# Patient Record
Sex: FEMALE | Race: WHITE | NOT HISPANIC OR LATINO
[De-identification: names, ages, dates, MRNs, and addresses within clinical notes are randomized per-mention and may not be internally consistent; named-entity substitution may affect disease eponyms.]

---

## 2018-03-06 ENCOUNTER — FORM ENCOUNTER (OUTPATIENT)
Age: 83
End: 2018-03-06

## 2018-09-18 ENCOUNTER — FORM ENCOUNTER (OUTPATIENT)
Age: 83
End: 2018-09-18

## 2018-10-03 ENCOUNTER — FORM ENCOUNTER (OUTPATIENT)
Age: 83
End: 2018-10-03

## 2018-11-18 ENCOUNTER — FORM ENCOUNTER (OUTPATIENT)
Age: 83
End: 2018-11-18

## 2019-03-01 PROBLEM — Z00.00 ENCOUNTER FOR PREVENTIVE HEALTH EXAMINATION: Status: ACTIVE | Noted: 2019-03-01

## 2019-03-15 DIAGNOSIS — Z78.9 OTHER SPECIFIED HEALTH STATUS: ICD-10-CM

## 2019-03-15 DIAGNOSIS — Z86.79 PERSONAL HISTORY OF OTHER DISEASES OF THE CIRCULATORY SYSTEM: ICD-10-CM

## 2019-03-15 DIAGNOSIS — Z86.39 PERSONAL HISTORY OF OTHER ENDOCRINE, NUTRITIONAL AND METABOLIC DISEASE: ICD-10-CM

## 2019-03-15 RX ORDER — LEVOTHYROXINE SODIUM 0.07 MG/1
75 TABLET ORAL
Refills: 0 | Status: ACTIVE | COMMUNITY
Start: 2019-03-15

## 2019-03-15 RX ORDER — FUROSEMIDE 20 MG/1
20 TABLET ORAL
Refills: 0 | Status: ACTIVE | COMMUNITY
Start: 2019-03-15

## 2019-03-15 RX ORDER — RALOXIFENE HYDROCHLORIDE 60 MG/1
60 TABLET, FILM COATED ORAL
Refills: 0 | Status: ACTIVE | COMMUNITY
Start: 2019-03-15

## 2019-03-15 RX ORDER — OMEPRAZOLE 20 MG/1
20 CAPSULE, DELAYED RELEASE ORAL
Refills: 0 | Status: ACTIVE | COMMUNITY
Start: 2019-03-15

## 2019-03-18 ENCOUNTER — APPOINTMENT (OUTPATIENT)
Dept: ORTHOPEDIC SURGERY | Facility: CLINIC | Age: 84
End: 2019-03-18
Payer: MEDICARE

## 2019-03-18 PROCEDURE — 20610 DRAIN/INJ JOINT/BURSA W/O US: CPT | Mod: RT

## 2019-03-18 RX ORDER — HYALURONATE SODIUM 20 MG/2 ML
20 SYRINGE (ML) INTRAARTICULAR
Refills: 0 | Status: COMPLETED | OUTPATIENT
Start: 2019-03-18

## 2019-03-18 RX ORDER — IBUPROFEN 600 MG/1
600 TABLET, FILM COATED ORAL
Refills: 0 | Status: ACTIVE | COMMUNITY

## 2019-03-18 RX ADMIN — Medication 2 MG/2ML: at 00:00

## 2019-03-18 NOTE — HISTORY OF PRESENT ILLNESS
[de-identified] : 83 year old female presents to the office today for her first euflexxa injection.

## 2019-03-18 NOTE — ASSESSMENT
[FreeTextEntry1] : Patient comes in for the first few flex injection in the right knee performed under sterile technique she tolerated well

## 2019-03-25 ENCOUNTER — APPOINTMENT (OUTPATIENT)
Dept: ORTHOPEDIC SURGERY | Facility: CLINIC | Age: 84
End: 2019-03-25
Payer: MEDICARE

## 2019-03-25 PROCEDURE — 20610 DRAIN/INJ JOINT/BURSA W/O US: CPT | Mod: RT

## 2019-03-25 RX ORDER — HYALURONATE SODIUM 20 MG/2 ML
20 SYRINGE (ML) INTRAARTICULAR
Refills: 0 | Status: COMPLETED | OUTPATIENT
Start: 2019-03-25

## 2019-03-25 RX ADMIN — Medication 2 MG/2ML: at 00:00

## 2019-03-25 NOTE — HISTORY OF PRESENT ILLNESS
[de-identified] : 83 year old female presents to the office today for her second euflexxa injection in her arthritic right knee.

## 2019-03-25 NOTE — ASSESSMENT
[FreeTextEntry1] : Patient comes in today for you flex injection in the right knee. There is performed under sterile technique. Tolerated well. Patient will followup in a week's time

## 2019-04-01 ENCOUNTER — APPOINTMENT (OUTPATIENT)
Dept: ORTHOPEDIC SURGERY | Facility: CLINIC | Age: 84
End: 2019-04-01
Payer: MEDICARE

## 2019-04-01 DIAGNOSIS — M25.561 PAIN IN RIGHT KNEE: ICD-10-CM

## 2019-04-01 PROCEDURE — 20610 DRAIN/INJ JOINT/BURSA W/O US: CPT | Mod: RT

## 2019-04-01 RX ORDER — HYALURONATE SODIUM 20 MG/2 ML
20 SYRINGE (ML) INTRAARTICULAR
Refills: 0 | Status: COMPLETED | OUTPATIENT
Start: 2019-04-01

## 2019-04-01 RX ADMIN — Medication 2 MG/2ML: at 00:00

## 2019-04-01 NOTE — ASSESSMENT
[FreeTextEntry1] : Patient comes in today for her third reflex injection in the right knee performed under sterile technique she tolerated well

## 2019-04-01 NOTE — HISTORY OF PRESENT ILLNESS
[de-identified] : 83 year old female presents to the office today for Euflexxa injection of her arthritic right knee.

## 2019-05-20 ENCOUNTER — RECORD ABSTRACTING (OUTPATIENT)
Age: 84
End: 2019-05-20

## 2019-05-20 DIAGNOSIS — E78.5 HYPERLIPIDEMIA, UNSPECIFIED: ICD-10-CM

## 2019-05-20 DIAGNOSIS — Z87.898 PERSONAL HISTORY OF OTHER SPECIFIED CONDITIONS: ICD-10-CM

## 2019-05-20 DIAGNOSIS — Z86.69 PERSONAL HISTORY OF OTHER DISEASES OF THE NERVOUS SYSTEM AND SENSE ORGANS: ICD-10-CM

## 2019-05-20 DIAGNOSIS — M65.4 RADIAL STYLOID TENOSYNOVITIS [DE QUERVAIN]: ICD-10-CM

## 2019-05-20 DIAGNOSIS — M19.90 UNSPECIFIED OSTEOARTHRITIS, UNSPECIFIED SITE: ICD-10-CM

## 2019-05-20 DIAGNOSIS — Z78.9 OTHER SPECIFIED HEALTH STATUS: ICD-10-CM

## 2019-05-20 DIAGNOSIS — R73.03 PREDIABETES.: ICD-10-CM

## 2019-05-22 ENCOUNTER — OUTPATIENT (OUTPATIENT)
Dept: OUTPATIENT SERVICES | Facility: HOSPITAL | Age: 84
LOS: 1 days | Discharge: HOME | End: 2019-05-22
Payer: COMMERCIAL

## 2019-05-22 VITALS
WEIGHT: 158.07 LBS | DIASTOLIC BLOOD PRESSURE: 73 MMHG | HEIGHT: 63 IN | RESPIRATION RATE: 16 BRPM | OXYGEN SATURATION: 99 % | TEMPERATURE: 96 F | SYSTOLIC BLOOD PRESSURE: 124 MMHG | HEART RATE: 69 BPM

## 2019-05-22 DIAGNOSIS — Z90.710 ACQUIRED ABSENCE OF BOTH CERVIX AND UTERUS: Chronic | ICD-10-CM

## 2019-05-22 DIAGNOSIS — G56.02 CARPAL TUNNEL SYNDROME, LEFT UPPER LIMB: ICD-10-CM

## 2019-05-22 DIAGNOSIS — Z98.890 OTHER SPECIFIED POSTPROCEDURAL STATES: Chronic | ICD-10-CM

## 2019-05-22 DIAGNOSIS — Z01.818 ENCOUNTER FOR OTHER PREPROCEDURAL EXAMINATION: ICD-10-CM

## 2019-05-22 LAB
ALBUMIN SERPL ELPH-MCNC: 4.1 G/DL — SIGNIFICANT CHANGE UP (ref 3.5–5.2)
ALP SERPL-CCNC: 100 U/L — SIGNIFICANT CHANGE UP (ref 30–115)
ALT FLD-CCNC: 10 U/L — SIGNIFICANT CHANGE UP (ref 0–41)
ANION GAP SERPL CALC-SCNC: 12 MMOL/L — SIGNIFICANT CHANGE UP (ref 7–14)
APTT BLD: 30.6 SEC — SIGNIFICANT CHANGE UP (ref 27–39.2)
AST SERPL-CCNC: 14 U/L — SIGNIFICANT CHANGE UP (ref 0–41)
BASOPHILS # BLD AUTO: 0.03 K/UL — SIGNIFICANT CHANGE UP (ref 0–0.2)
BASOPHILS NFR BLD AUTO: 0.6 % — SIGNIFICANT CHANGE UP (ref 0–1)
BILIRUB SERPL-MCNC: 0.4 MG/DL — SIGNIFICANT CHANGE UP (ref 0.2–1.2)
BUN SERPL-MCNC: 16 MG/DL — SIGNIFICANT CHANGE UP (ref 10–20)
CALCIUM SERPL-MCNC: 9 MG/DL — SIGNIFICANT CHANGE UP (ref 8.5–10.1)
CHLORIDE SERPL-SCNC: 104 MMOL/L — SIGNIFICANT CHANGE UP (ref 98–110)
CO2 SERPL-SCNC: 25 MMOL/L — SIGNIFICANT CHANGE UP (ref 17–32)
CREAT SERPL-MCNC: 0.7 MG/DL — SIGNIFICANT CHANGE UP (ref 0.7–1.5)
EOSINOPHIL # BLD AUTO: 0.07 K/UL — SIGNIFICANT CHANGE UP (ref 0–0.7)
EOSINOPHIL NFR BLD AUTO: 1.3 % — SIGNIFICANT CHANGE UP (ref 0–8)
GLUCOSE SERPL-MCNC: 91 MG/DL — SIGNIFICANT CHANGE UP (ref 70–99)
HCT VFR BLD CALC: 39.3 % — SIGNIFICANT CHANGE UP (ref 37–47)
HGB BLD-MCNC: 13.2 G/DL — SIGNIFICANT CHANGE UP (ref 12–16)
IMM GRANULOCYTES NFR BLD AUTO: 0.4 % — HIGH (ref 0.1–0.3)
INR BLD: 0.96 RATIO — SIGNIFICANT CHANGE UP (ref 0.65–1.3)
LYMPHOCYTES # BLD AUTO: 1.69 K/UL — SIGNIFICANT CHANGE UP (ref 1.2–3.4)
LYMPHOCYTES # BLD AUTO: 31.5 % — SIGNIFICANT CHANGE UP (ref 20.5–51.1)
MCHC RBC-ENTMCNC: 31.2 PG — HIGH (ref 27–31)
MCHC RBC-ENTMCNC: 33.6 G/DL — SIGNIFICANT CHANGE UP (ref 32–37)
MCV RBC AUTO: 92.9 FL — SIGNIFICANT CHANGE UP (ref 81–99)
MONOCYTES # BLD AUTO: 0.56 K/UL — SIGNIFICANT CHANGE UP (ref 0.1–0.6)
MONOCYTES NFR BLD AUTO: 10.4 % — HIGH (ref 1.7–9.3)
NEUTROPHILS # BLD AUTO: 3 K/UL — SIGNIFICANT CHANGE UP (ref 1.4–6.5)
NEUTROPHILS NFR BLD AUTO: 55.8 % — SIGNIFICANT CHANGE UP (ref 42.2–75.2)
NRBC # BLD: 0 /100 WBCS — SIGNIFICANT CHANGE UP (ref 0–0)
PLATELET # BLD AUTO: 168 K/UL — SIGNIFICANT CHANGE UP (ref 130–400)
POTASSIUM SERPL-MCNC: 4.4 MMOL/L — SIGNIFICANT CHANGE UP (ref 3.5–5)
POTASSIUM SERPL-SCNC: 4.4 MMOL/L — SIGNIFICANT CHANGE UP (ref 3.5–5)
PROT SERPL-MCNC: 6.8 G/DL — SIGNIFICANT CHANGE UP (ref 6–8)
PROTHROM AB SERPL-ACNC: 11.1 SEC — SIGNIFICANT CHANGE UP (ref 9.95–12.87)
RBC # BLD: 4.23 M/UL — SIGNIFICANT CHANGE UP (ref 4.2–5.4)
RBC # FLD: 12.8 % — SIGNIFICANT CHANGE UP (ref 11.5–14.5)
SODIUM SERPL-SCNC: 141 MMOL/L — SIGNIFICANT CHANGE UP (ref 135–146)
WBC # BLD: 5.37 K/UL — SIGNIFICANT CHANGE UP (ref 4.8–10.8)
WBC # FLD AUTO: 5.37 K/UL — SIGNIFICANT CHANGE UP (ref 4.8–10.8)

## 2019-05-22 PROCEDURE — 93010 ELECTROCARDIOGRAM REPORT: CPT

## 2019-05-22 PROCEDURE — 71046 X-RAY EXAM CHEST 2 VIEWS: CPT | Mod: 26

## 2019-05-22 NOTE — H&P PST ADULT - NSICDXPASTSURGICALHX_GEN_ALL_CORE_FT
PAST SURGICAL HISTORY:  H/O arthroscopy of knee     S/P carpal tunnel release right    S/P hysterectomy     S/P rotator cuff repair

## 2019-05-22 NOTE — H&P PST ADULT - NSICDXPASTMEDICALHX_GEN_ALL_CORE_FT
PAST MEDICAL HISTORY:  GERD (gastroesophageal reflux disease)     HTN (hypertension)     Hypothyroid     OA (osteoarthritis)     Uterine cancer 2009

## 2019-05-22 NOTE — H&P PST ADULT - HISTORY OF PRESENT ILLNESS
83 yr old female with a history of numbness in the left hand scheduled for left open carpal tunnel release with dr blackwood on 10/5/19.  denies cp sob cough uri palp dysuria  2 fos without sob or cp

## 2019-05-31 PROBLEM — M19.90 UNSPECIFIED OSTEOARTHRITIS, UNSPECIFIED SITE: Chronic | Status: ACTIVE | Noted: 2019-05-22

## 2019-05-31 PROBLEM — E03.9 HYPOTHYROIDISM, UNSPECIFIED: Chronic | Status: ACTIVE | Noted: 2019-05-22

## 2019-05-31 PROBLEM — K21.9 GASTRO-ESOPHAGEAL REFLUX DISEASE WITHOUT ESOPHAGITIS: Chronic | Status: ACTIVE | Noted: 2019-05-22

## 2019-05-31 PROBLEM — I10 ESSENTIAL (PRIMARY) HYPERTENSION: Chronic | Status: ACTIVE | Noted: 2019-05-22

## 2019-05-31 PROBLEM — C55 MALIGNANT NEOPLASM OF UTERUS, PART UNSPECIFIED: Chronic | Status: ACTIVE | Noted: 2019-05-22

## 2019-06-03 ENCOUNTER — APPOINTMENT (OUTPATIENT)
Dept: ORTHOPEDIC SURGERY | Facility: CLINIC | Age: 84
End: 2019-06-03
Payer: MEDICARE

## 2019-06-03 DIAGNOSIS — M25.562 PAIN IN LEFT KNEE: ICD-10-CM

## 2019-06-03 PROCEDURE — 73562 X-RAY EXAM OF KNEE 3: CPT | Mod: RT

## 2019-06-03 PROCEDURE — 99214 OFFICE O/P EST MOD 30 MIN: CPT

## 2019-06-03 RX ORDER — BENAZEPRIL HYDROCHLORIDE 5 MG/1
TABLET ORAL
Refills: 0 | Status: DISCONTINUED | COMMUNITY
End: 2019-06-03

## 2019-06-03 RX ORDER — AMLODIPINE AND OLMESARTAN MEDOXOMIL 5; 20 MG/1; MG/1
5-20 TABLET ORAL
Refills: 0 | Status: DISCONTINUED | COMMUNITY
Start: 2019-03-15 | End: 2019-06-03

## 2019-06-04 NOTE — ASU PATIENT PROFILE, ADULT - PSH
H/O arthroscopy of knee    S/P carpal tunnel release  right  S/P hysterectomy    S/P rotator cuff repair

## 2019-06-04 NOTE — HISTORY OF PRESENT ILLNESS
[Pain Location] : pain [] : left knee [Stable] : stable [de-identified] : 83 year old female presents to the office today complaining of left knee pain. Pt was recently seen in our office for a series of Euflexxa injections in her right arthritic knee. She states the pain comes and goes. She reports noticing the onset of pain at night, but denies it being every night. She reports taking Ibuprofen for the pain with relief. Pt states that she has "episodes of this pain" that last for at least a couple of hours. She states the pain is a sensation of numbness. Pt states that the pain can occasionally radiate down to the foot and her foot begins to have that numbing sensation. Pt denies any trauma or fall onto the left knee.

## 2019-06-04 NOTE — ASU PATIENT PROFILE, ADULT - PMH
GERD (gastroesophageal reflux disease)    HTN (hypertension)    Hypothyroid    OA (osteoarthritis)    Uterine cancer  2009

## 2019-06-04 NOTE — PHYSICAL EXAM
[2+] : left 2+ [Normal] : Alert and in no acute distress [de-identified] : Left Knee\par Inspection: no effusion or erythema.\par Wounds: none.\par Alignment: normal.\par Palpation: no specific tenderness on palpation.\par ROM: Full active and passive ROM with flexion and extension. No pain with ROM. \par Muscle Test: good quad strength.\par Leg examination: calf is soft and non-tender.\par  [de-identified] : Sensation grossly intact about bilateral lower extremities.\par  [de-identified] : Radiographs done on Mandi 3, 2019 AP lateral skyline views the left knee show the bony structures to be intact the fractures or dislocations she has medial joint space narrowing consistent with medial joint osteoarthritis

## 2019-06-04 NOTE — ASSESSMENT
[FreeTextEntry1] : This is patient is well known to me been following her for treating her for her osteoarthritis of the knee. She comes in today complaining of pain that starts at the lateral aspect the knee and radiates down into her calf ankle and foot. I explained to her that she has radiographically medial joint space narrowing and that I cannot correlate her radiographic findings with her clinical symptoms. The left lower extremity is neurovascularly intact she has good muscle strength at the ankle to dorsiflexion and plantar flexion her sensation is intact supplemental loss for explaining her symptoms in her lower extremity. She does have a history of neuropathy and I told her that I thought that perhaps her symptoms her neurologic and that she should see a neurologist to have this evaluated

## 2019-06-05 ENCOUNTER — OUTPATIENT (OUTPATIENT)
Dept: OUTPATIENT SERVICES | Facility: HOSPITAL | Age: 84
LOS: 1 days | Discharge: HOME | End: 2019-06-05

## 2019-06-05 VITALS
HEIGHT: 63 IN | RESPIRATION RATE: 18 BRPM | WEIGHT: 158.07 LBS | DIASTOLIC BLOOD PRESSURE: 70 MMHG | HEART RATE: 65 BPM | TEMPERATURE: 98 F | OXYGEN SATURATION: 99 % | SYSTOLIC BLOOD PRESSURE: 147 MMHG

## 2019-06-05 VITALS
DIASTOLIC BLOOD PRESSURE: 60 MMHG | OXYGEN SATURATION: 95 % | HEART RATE: 62 BPM | RESPIRATION RATE: 20 BRPM | SYSTOLIC BLOOD PRESSURE: 124 MMHG

## 2019-06-05 DIAGNOSIS — Z98.890 OTHER SPECIFIED POSTPROCEDURAL STATES: Chronic | ICD-10-CM

## 2019-06-05 DIAGNOSIS — Z90.710 ACQUIRED ABSENCE OF BOTH CERVIX AND UTERUS: Chronic | ICD-10-CM

## 2019-06-05 RX ORDER — HYDROMORPHONE HYDROCHLORIDE 2 MG/ML
0.5 INJECTION INTRAMUSCULAR; INTRAVENOUS; SUBCUTANEOUS
Refills: 0 | Status: DISCONTINUED | OUTPATIENT
Start: 2019-06-05 | End: 2019-06-05

## 2019-06-05 RX ORDER — RALOXIFENE HYDROCHLORIDE 60 MG/1
1 TABLET, COATED ORAL
Qty: 0 | Refills: 0 | DISCHARGE

## 2019-06-05 RX ORDER — ONDANSETRON 8 MG/1
4 TABLET, FILM COATED ORAL ONCE
Refills: 0 | Status: DISCONTINUED | OUTPATIENT
Start: 2019-06-05 | End: 2019-06-20

## 2019-06-05 RX ORDER — FUROSEMIDE 40 MG
1 TABLET ORAL
Qty: 0 | Refills: 0 | DISCHARGE

## 2019-06-05 RX ORDER — AMLODIPINE BESYLATE AND BENAZEPRIL HYDROCHLORIDE 10; 20 MG/1; MG/1
1 CAPSULE ORAL
Qty: 0 | Refills: 0 | DISCHARGE

## 2019-06-05 RX ORDER — MORPHINE SULFATE 50 MG/1
2 CAPSULE, EXTENDED RELEASE ORAL
Refills: 0 | Status: DISCONTINUED | OUTPATIENT
Start: 2019-06-05 | End: 2019-06-05

## 2019-06-05 RX ORDER — OMEPRAZOLE 10 MG/1
1 CAPSULE, DELAYED RELEASE ORAL
Qty: 0 | Refills: 0 | DISCHARGE

## 2019-06-05 RX ORDER — OXYCODONE AND ACETAMINOPHEN 5; 325 MG/1; MG/1
1 TABLET ORAL ONCE
Refills: 0 | Status: DISCONTINUED | OUTPATIENT
Start: 2019-06-05 | End: 2019-06-05

## 2019-06-05 RX ORDER — SODIUM CHLORIDE 9 MG/ML
1000 INJECTION, SOLUTION INTRAVENOUS
Refills: 0 | Status: DISCONTINUED | OUTPATIENT
Start: 2019-06-05 | End: 2019-06-20

## 2019-06-05 RX ORDER — CHOLECALCIFEROL (VITAMIN D3) 125 MCG
1 CAPSULE ORAL
Qty: 0 | Refills: 0 | DISCHARGE

## 2019-06-05 RX ORDER — IBUPROFEN 200 MG
1 TABLET ORAL
Qty: 0 | Refills: 0 | DISCHARGE

## 2019-06-05 RX ORDER — SIMVASTATIN 20 MG/1
1 TABLET, FILM COATED ORAL
Qty: 0 | Refills: 0 | DISCHARGE

## 2019-06-05 RX ORDER — LEVOTHYROXINE SODIUM 125 MCG
1 TABLET ORAL
Qty: 0 | Refills: 0 | DISCHARGE

## 2019-06-05 RX ORDER — IBUPROFEN 200 MG
1 TABLET ORAL
Qty: 20 | Refills: 0
Start: 2019-06-05

## 2019-06-05 RX ADMIN — SODIUM CHLORIDE 100 MILLILITER(S): 9 INJECTION, SOLUTION INTRAVENOUS at 13:45

## 2019-06-05 NOTE — ASU DISCHARGE PLAN (ADULT/PEDIATRIC) - ASU DC SPECIAL INSTRUCTIONSFT

## 2019-06-08 DIAGNOSIS — Z80.3 FAMILY HISTORY OF MALIGNANT NEOPLASM OF BREAST: ICD-10-CM

## 2019-06-08 DIAGNOSIS — K21.9 GASTRO-ESOPHAGEAL REFLUX DISEASE WITHOUT ESOPHAGITIS: ICD-10-CM

## 2019-06-08 DIAGNOSIS — G56.02 CARPAL TUNNEL SYNDROME, LEFT UPPER LIMB: ICD-10-CM

## 2019-06-08 DIAGNOSIS — M19.90 UNSPECIFIED OSTEOARTHRITIS, UNSPECIFIED SITE: ICD-10-CM

## 2019-06-08 DIAGNOSIS — Z85.42 PERSONAL HISTORY OF MALIGNANT NEOPLASM OF OTHER PARTS OF UTERUS: ICD-10-CM

## 2019-06-08 DIAGNOSIS — Z87.891 PERSONAL HISTORY OF NICOTINE DEPENDENCE: ICD-10-CM

## 2019-06-08 DIAGNOSIS — E03.9 HYPOTHYROIDISM, UNSPECIFIED: ICD-10-CM

## 2019-06-08 DIAGNOSIS — I10 ESSENTIAL (PRIMARY) HYPERTENSION: ICD-10-CM

## 2019-06-08 DIAGNOSIS — Z90.710 ACQUIRED ABSENCE OF BOTH CERVIX AND UTERUS: ICD-10-CM

## 2019-06-08 DIAGNOSIS — Z88.2 ALLERGY STATUS TO SULFONAMIDES: ICD-10-CM

## 2019-06-19 ENCOUNTER — TRANSCRIPTION ENCOUNTER (OUTPATIENT)
Age: 84
End: 2019-06-19

## 2019-06-24 ENCOUNTER — MEDICATION RENEWAL (OUTPATIENT)
Age: 84
End: 2019-06-24

## 2019-06-24 ENCOUNTER — APPOINTMENT (OUTPATIENT)
Dept: NEUROLOGY | Facility: CLINIC | Age: 84
End: 2019-06-24
Payer: MEDICARE

## 2019-06-24 VITALS
WEIGHT: 155 LBS | HEART RATE: 84 BPM | SYSTOLIC BLOOD PRESSURE: 109 MMHG | BODY MASS INDEX: 28.52 KG/M2 | DIASTOLIC BLOOD PRESSURE: 66 MMHG | HEIGHT: 62 IN

## 2019-06-24 PROCEDURE — 99213 OFFICE O/P EST LOW 20 MIN: CPT

## 2019-06-24 NOTE — ASSESSMENT
[FreeTextEntry1] : This is an 83-year-old right-handed woman with a history of osteoarthritis, hypothyroidism, dyslipidemia, hypertension, and prediabetes with persistent distal lower extremity paresthesias and numbness negative w/u s/o SFN currently stable.  transient L lateral leg numb/pain possible mild peroneal neuropathy currently asymptomatic and nonfocal.  L CTS s/p release doing well.  \par \par Plan:\par - continue observation for recurrent peroneal neuropathy\par - Check screening labs for small fiber neuropathy at next visit\par - return to office in 9 - 12 months.\par

## 2019-06-24 NOTE — PHYSICAL EXAM
[FreeTextEntry1] : Neurologic Examination:\par NAD. AOx3.  Intact memory.  Speech fluent, nondysarthric.  CN 2 – 12 normal.  \par Strength 5/5 b/l UE/LE.  NL tone, bulk. No abnl movements.  DTRs 2+ throughout.  Plantar response downgoing b/l.  (-) Hoffmans, clonus.  Sensory intact LT/PP, pain, temp, proprioception and vibration.  NL FTN/HKS.  No dysdiadokinesia.  Gait narrow based/NL tandem.  no TTP L fibular head.\par

## 2019-06-24 NOTE — HISTORY OF PRESENT ILLNESS
[FreeTextEntry1] : Since her last visit, Ms. Gonzalez had CTS release R hand with Dr. Doll and had immediately relief post-operatively.  Is currently stable and doing well.  Had recent episode of tranisent recurrent lancinating pain with numbness and paresthesias in the L distal leg from lateral knee down calf and ankle.  2 episodes weekly x 2 weeks not associated with weakness.  Symptoms resolved spontaneously and had seen orthopedist who didn't find any knee pathology.  LE paresthesias are tolerable and have not progressed.

## 2019-06-26 ENCOUNTER — APPOINTMENT (OUTPATIENT)
Dept: NEUROLOGY | Facility: CLINIC | Age: 84
End: 2019-06-26

## 2019-10-07 ENCOUNTER — APPOINTMENT (OUTPATIENT)
Dept: ORTHOPEDIC SURGERY | Facility: CLINIC | Age: 84
End: 2019-10-07
Payer: MEDICARE

## 2019-10-07 DIAGNOSIS — M17.11 UNILATERAL PRIMARY OSTEOARTHRITIS, RIGHT KNEE: ICD-10-CM

## 2019-10-07 DIAGNOSIS — M17.12 UNILATERAL PRIMARY OSTEOARTHRITIS, LEFT KNEE: ICD-10-CM

## 2019-10-07 PROCEDURE — 99213 OFFICE O/P EST LOW 20 MIN: CPT

## 2019-10-07 NOTE — ASSESSMENT
[FreeTextEntry1] : Pt here to fu after bilateral Euflexxa injections. She continues to have pain, Tx options were discussed and we decided to give her Ibuprofen 800 mg, 3x a day, for 1 month. Fu 1 month.

## 2019-10-07 NOTE — HISTORY OF PRESENT ILLNESS
[de-identified] : 83 year old female presents to the office today for a follow up on her bilateral arthritic knees. She reports a pain that comes and goes in both of her knees with a constant stiffness. She reports a pain in the posterior left knee. She states her pain gets better with use. Pt reports swelling but denies any locking, clicking, buckling or loss of motion. She is able to ambulate unlimited distances. Pt reports taking Ibuprofen for pain with some relief. She has posterior knee pain and stiffness with negotiating stairs. Pt had a gel injection in her right knee within the last year without any relief. She did go for the neurology work up as requested after she was last seen in June which came back inconclusive.

## 2019-10-07 NOTE — PHYSICAL EXAM
[de-identified] : Left Knee\par Inspection: no effusion or erythema.\par Wounds: none.\par Alignment: normal.\par Palpation: no specific tenderness on palpation.\par ROM: 0-90 degrees\par Ligamentous laxity: all ligaments appear stable\par Meniscal Test: n/a\par Muscle Test: good quad strength.\par Leg examination: calf is soft and non-tender.\par \par Right knee\par Inspection: no effusion or erythema.\par Wounds: none.\par Alignment: normal.\par Palpation: no specific tenderness on palpation.\par ROM: 0-90 degrees\par Ligamentous laxity: all ligaments appear \par Meniscal Test: n/a\par Muscle Test: good quad strength.\par Leg examination: calf is soft and non-tender. [de-identified] : No imaging was done today

## 2019-11-29 ENCOUNTER — RECORD ABSTRACTING (OUTPATIENT)
Age: 84
End: 2019-11-29

## 2019-11-29 DIAGNOSIS — N81.11 CYSTOCELE, MIDLINE: ICD-10-CM

## 2019-11-29 DIAGNOSIS — S83.249A OTHER TEAR OF MEDIAL MENISCUS, CURRENT INJURY, UNSPECIFIED KNEE, INITIAL ENCOUNTER: ICD-10-CM

## 2019-11-29 DIAGNOSIS — Z78.9 OTHER SPECIFIED HEALTH STATUS: ICD-10-CM

## 2019-11-29 DIAGNOSIS — Z85.42 PERSONAL HISTORY OF MALIGNANT NEOPLASM OF OTHER PARTS OF UTERUS: ICD-10-CM

## 2019-11-29 DIAGNOSIS — Z78.0 ASYMPTOMATIC MENOPAUSAL STATE: ICD-10-CM

## 2019-11-29 DIAGNOSIS — Z87.898 PERSONAL HISTORY OF OTHER SPECIFIED CONDITIONS: ICD-10-CM

## 2019-11-29 DIAGNOSIS — Z87.19 PERSONAL HISTORY OF OTHER DISEASES OF THE DIGESTIVE SYSTEM: ICD-10-CM

## 2019-11-29 DIAGNOSIS — Z80.3 FAMILY HISTORY OF MALIGNANT NEOPLASM OF BREAST: ICD-10-CM

## 2019-11-29 DIAGNOSIS — M76.50 PATELLAR TENDINITIS, UNSPECIFIED KNEE: ICD-10-CM

## 2020-07-20 ENCOUNTER — TRANSCRIPTION ENCOUNTER (OUTPATIENT)
Age: 85
End: 2020-07-20

## 2020-11-16 ENCOUNTER — APPOINTMENT (OUTPATIENT)
Dept: ORTHOPEDIC SURGERY | Facility: CLINIC | Age: 85
End: 2020-11-16
Payer: MEDICARE

## 2020-11-16 DIAGNOSIS — M25.551 PAIN IN RIGHT HIP: ICD-10-CM

## 2020-11-16 PROCEDURE — 99214 OFFICE O/P EST MOD 30 MIN: CPT

## 2020-11-16 PROCEDURE — 73502 X-RAY EXAM HIP UNI 2-3 VIEWS: CPT | Mod: RT

## 2020-11-16 NOTE — HISTORY OF PRESENT ILLNESS
[de-identified] : 85 year old female presents to the office today complaining of right hip pain that started on Saturday morning, 11/14/2020. Patient reports pain that is sharp in nature. When asked where her pain is, patient points to her right groin and reports being fearful that her right hip will buckle from underneath her. Patient reports having pain worse in the morning that decreases as the day progress. She states the morning pain is 8/10 and by the afternoon it is 4/10. Patient reports difficulty with ascending stairs. She also reports difficulty with putting socks and shoes on. Patient reports taking Ibuprofen 600 mg for pain today but is unsure if it relieved her pain. Patient is here today to discuss her next options for pain relief.

## 2020-11-16 NOTE — ASSESSMENT
[FreeTextEntry1] : 85 year old female with intermittent groin pain. Some pain with ranging the hip and walking up and down the mcdonald. Xrays were unremarkable. Out of an abundance of caution we will get an MRI to rule out a fracture.

## 2020-11-16 NOTE — PHYSICAL EXAM
[de-identified] : Left hip\par Inspection: No swelling or ecchymosis.\par Wounds: none.\par Palpation: non-tender.\par Stability: no instability.\par Strength: 5/5 all motor groups.\par ROM: ACTIVE ROM IN DEGREES (Full active and passive ROM, no pain with FROM)\par Leg length: equal.\par \par Right hip\par Inspection: No swelling or ecchymosis.\par Wounds: none.\par Palpation: non-tender.\par Stability: no instability.\par Strength: 5/5 all motor groups.\par ROM: ACTIVE ROM IN DEGREES (Full active and passive ROM, no pain with FROM)\par Leg length: equal.\par \par  [de-identified] : Imaging done today, AP pelvis and lateral shows well maintained joint spaces

## 2021-01-03 ENCOUNTER — TRANSCRIPTION ENCOUNTER (OUTPATIENT)
Age: 86
End: 2021-01-03

## 2021-01-20 ENCOUNTER — TRANSCRIPTION ENCOUNTER (OUTPATIENT)
Age: 86
End: 2021-01-20

## 2021-07-11 ENCOUNTER — TRANSCRIPTION ENCOUNTER (OUTPATIENT)
Age: 86
End: 2021-07-11

## 2021-07-22 NOTE — H&P PST ADULT - ACTIVITY
"Pt to room 4 via WC. With c/o increased \"off balance\". 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 Had a fall with oribital suture repair about a week ago  " 2 fos

## 2021-09-01 ENCOUNTER — TRANSCRIPTION ENCOUNTER (OUTPATIENT)
Age: 86
End: 2021-09-01

## 2022-05-27 ENCOUNTER — NON-APPOINTMENT (OUTPATIENT)
Age: 87
End: 2022-05-27

## 2022-05-28 ENCOUNTER — TRANSCRIPTION ENCOUNTER (OUTPATIENT)
Age: 87
End: 2022-05-28

## 2022-05-31 ENCOUNTER — OUTPATIENT (OUTPATIENT)
Dept: INPATIENT UNIT | Facility: HOSPITAL | Age: 87
LOS: 1 days | Discharge: HOME | End: 2022-05-31

## 2022-05-31 ENCOUNTER — APPOINTMENT (OUTPATIENT)
Age: 87
End: 2022-05-31

## 2022-05-31 VITALS
TEMPERATURE: 98 F | DIASTOLIC BLOOD PRESSURE: 71 MMHG | OXYGEN SATURATION: 99 % | SYSTOLIC BLOOD PRESSURE: 145 MMHG | HEART RATE: 72 BPM | RESPIRATION RATE: 17 BRPM

## 2022-05-31 VITALS
RESPIRATION RATE: 16 BRPM | SYSTOLIC BLOOD PRESSURE: 145 MMHG | DIASTOLIC BLOOD PRESSURE: 76 MMHG | TEMPERATURE: 100 F | OXYGEN SATURATION: 95 % | HEART RATE: 74 BPM

## 2022-05-31 DIAGNOSIS — U07.1 COVID-19: ICD-10-CM

## 2022-05-31 DIAGNOSIS — Z98.890 OTHER SPECIFIED POSTPROCEDURAL STATES: Chronic | ICD-10-CM

## 2022-05-31 DIAGNOSIS — Z90.710 ACQUIRED ABSENCE OF BOTH CERVIX AND UTERUS: Chronic | ICD-10-CM

## 2022-05-31 RX ORDER — BEBTELOVIMAB 87.5 MG/ML
175 INJECTION, SOLUTION INTRAVENOUS ONCE
Refills: 0 | Status: COMPLETED | OUTPATIENT
Start: 2022-05-31 | End: 2022-05-31

## 2022-05-31 RX ADMIN — BEBTELOVIMAB 175 MILLIGRAM(S): 87.5 INJECTION, SOLUTION INTRAVENOUS at 09:45

## 2022-05-31 NOTE — MONOCLONAL ANTIBODY INFUSION - HOME MEDICATIONS
mg oral tablet , 1 tab(s) orally 3 times a day, As Needed MDD:3  tumeric/black pepper  Vitamin D3 2000 intl units oral tablet , 1 tab(s) orally once a day  Vitamin B-12  2000mcg  Calcium 600+D , orally once a day  d-mannose 500mg/ vitamin c 30mg/ carnberry ext  400mg , orally 2 times a day  ibuprofen 600 mg oral tablet , 1 tab(s) orally every 6 hours, As Needed  omeprazole 20 mg oral delayed release capsule , 1 cap(s) orally once a day, As Needed  furosemide 20 mg oral tablet , 1 tab(s) orally once a day  raloxifene 60 mg oral tablet , 1 tab(s) orally once a day  amlodipine-benazepril 5 mg-20 mg oral capsule , 1 cap(s) orally once a day  simvastatin 20 mg oral tablet , 1 tab(s) orally once a day (at bedtime)  levothyroxine 75 mcg (0.075 mg) oral tablet , 1 tab(s) orally once a day

## 2022-06-01 ENCOUNTER — TRANSCRIPTION ENCOUNTER (OUTPATIENT)
Age: 87
End: 2022-06-01

## 2022-10-20 ENCOUNTER — NON-APPOINTMENT (OUTPATIENT)
Age: 87
End: 2022-10-20

## 2022-11-07 ENCOUNTER — APPOINTMENT (OUTPATIENT)
Dept: OBGYN | Facility: CLINIC | Age: 87
End: 2022-11-07
Payer: MEDICARE

## 2022-11-07 VITALS
TEMPERATURE: 97.3 F | BODY MASS INDEX: 30.23 KG/M2 | WEIGHT: 154 LBS | DIASTOLIC BLOOD PRESSURE: 73 MMHG | SYSTOLIC BLOOD PRESSURE: 142 MMHG | HEIGHT: 60 IN | HEART RATE: 73 BPM

## 2022-11-07 DIAGNOSIS — R39.89 OTHER SYMPTOMS AND SIGNS INVOLVING THE GENITOURINARY SYSTEM: ICD-10-CM

## 2022-11-07 DIAGNOSIS — Z87.19 PERSONAL HISTORY OF OTHER DISEASES OF THE DIGESTIVE SYSTEM: ICD-10-CM

## 2022-11-07 DIAGNOSIS — Z71.89 OTHER SPECIFIED COUNSELING: ICD-10-CM

## 2022-11-07 DIAGNOSIS — Z82.49 FAMILY HISTORY OF ISCHEMIC HEART DISEASE AND OTHER DISEASES OF THE CIRCULATORY SYSTEM: ICD-10-CM

## 2022-11-07 DIAGNOSIS — Z01.419 ENCOUNTER FOR GYNECOLOGICAL EXAMINATION (GENERAL) (ROUTINE) W/OUT ABNORMAL FINDINGS: ICD-10-CM

## 2022-11-07 DIAGNOSIS — Z12.4 ENCOUNTER FOR SCREENING FOR MALIGNANT NEOPLASM OF CERVIX: ICD-10-CM

## 2022-11-07 DIAGNOSIS — Z80.0 FAMILY HISTORY OF MALIGNANT NEOPLASM OF DIGESTIVE ORGANS: ICD-10-CM

## 2022-11-07 LAB
BILIRUB UR QL STRIP: NORMAL
CLARITY UR: CLEAR
COLLECTION METHOD: NORMAL
GLUCOSE UR-MCNC: NORMAL
HCG UR QL: 0.2 EU/DL
HGB UR QL STRIP.AUTO: NORMAL
KETONES UR-MCNC: NORMAL
LEUKOCYTE ESTERASE UR QL STRIP: ABNORMAL
NITRITE UR QL STRIP: NORMAL
PH UR STRIP: 6
PROT UR STRIP-MCNC: NORMAL
SP GR UR STRIP: 1.01

## 2022-11-07 PROCEDURE — G0101: CPT

## 2022-11-07 PROCEDURE — 81003 URINALYSIS AUTO W/O SCOPE: CPT | Mod: QW

## 2022-11-11 LAB
BACTERIA UR CULT: ABNORMAL
HPV HIGH+LOW RISK DNA PNL CVX: NOT DETECTED

## 2022-11-21 LAB — CYTOLOGY CVX/VAG DOC THIN PREP: ABNORMAL

## 2023-01-12 ENCOUNTER — NON-APPOINTMENT (OUTPATIENT)
Age: 88
End: 2023-01-12

## 2023-01-18 PROBLEM — Z82.49 FAMILY HISTORY OF HYPERTENSION: Status: ACTIVE | Noted: 2023-01-18

## 2023-01-18 PROBLEM — Z71.89 OTHER SPECIFIED COUNSELING: Status: ACTIVE | Noted: 2023-01-18

## 2023-01-18 PROBLEM — Z80.0 FAMILY HISTORY OF COLORECTAL CANCER: Status: ACTIVE | Noted: 2023-01-18

## 2023-01-18 PROBLEM — Z01.419 ENCOUNTER FOR ANNUAL ROUTINE GYNECOLOGICAL EXAMINATION: Status: ACTIVE | Noted: 2023-01-18

## 2023-01-18 PROBLEM — Z87.19 HISTORY OF GASTROESOPHAGEAL REFLUX (GERD): Status: RESOLVED | Noted: 2023-01-18 | Resolved: 2023-01-18

## 2023-01-18 RX ORDER — CHROMIUM 200 MCG
TABLET ORAL
Refills: 0 | Status: ACTIVE | COMMUNITY

## 2023-01-18 NOTE — PHYSICAL EXAM
[Appropriately responsive] : appropriately responsive [Alert] : alert [No Acute Distress] : no acute distress [No Lymphadenopathy] : no lymphadenopathy [Regular Rate Rhythm] : regular rate rhythm [Soft] : soft [Non-tender] : non-tender [Non-distended] : non-distended [No Mass] : no mass [Oriented x3] : oriented x3 [Examination Of The Breasts] : a normal appearance [No Discharge] : no discharge [No Masses] : no breast masses were palpable [Vulvar Atrophy] : vulvar atrophy [No Lesions] : no lesions  [Labia Majora] : normal [Labia Minora] : normal [Normal] : normal [Atrophy] : atrophy [No Bleeding] : There was no active vaginal bleeding [Absent] : absent [Uterine Adnexae] : absent [FreeTextEntry4] : Pap smear done of cough [FreeTextEntry6] : Exam limited to habitus, on bimanual exam no masses or tenderness noted

## 2023-01-18 NOTE — DISCUSSION/SUMMARY
[FreeTextEntry1] : A: 86-year-old for annual GYN exam, vulvovaginal atrophy, dysuria, leukocytes in urine, history of uterine cancer, BMI 30\par \par P: GYN exam done\par Pap smear done\par Pain and bleeding precautions\par Urine culture sent\par Will start Macrobid until urine culture results received\par Referral for mammogram given\par Referral for DEXA given\par Encourage healthy diet and exercise as tolerated\par Follow-up for routine exam or as needed

## 2023-01-18 NOTE — HISTORY OF PRESENT ILLNESS
[Patient reported mammogram was normal] : Patient reported mammogram was normal [Patient reported PAP Smear was normal] : Patient reported PAP Smear was normal [Patient reported bone density results were normal] : Patient reported bone density results were normal [Patient reported colonoscopy was normal] : Patient reported colonoscopy was normal [LMP unknown] : LMP unknown [postmenopausal] : postmenopausal [N] : Patient is not sexually active [Y] : Positive pregnancy history [unknown] : Patient is unsure of the date of her LMP [Menarche Age: ____] : age at menarche was [unfilled] [Currently In Menopause] : currently in menopause [Menopause Age: ____] : age at menopause was [unfilled] [Previously active] : previously active [Patient reported bone density results were abnormal] : Patient reported bone density results were abnormal [HPV test offered] : HPV test offered [Men] : men [No] : No [TextBox_4] : 86-year-old para 3-0-0-3 and menopause came for annual GYN exam and Pap smear.  She denies postmenopausal bleeding, pelvic pain or discharge.  She does complain of some burning sensation and also some dysuria.  She denies history of abnormal Pap, HPV, STDs, fibroids or ovarian cysts.  She did have a history of postmenopausal bleeding and with work-up was noted to have uterine cancer.  She is status post hysterectomy/BSO.  She is not sexually active.\par \par U dip: Small leukocytes [Mammogramdate] : 2020 [TextBox_19] : Will give referral [PapSmeardate] : 2020 [BoneDensityDate] : 2018 [TextBox_37] : Osteopenia- will give referral [ColonoscopyDate] : 2017 [TextBox_43] : f/u 10 yrs  [PGHxTotal] : 3 [[facility]] : 3 [PGHxPremature] : 0 [PGHxAbortions] : 0 [[facility] Centeriving] : 3 [PGHxABInduced] : 0 [PGHxABSpont] : 0 [PGHxEctopic] : 0 [PGHxMultBirths] : 0 [FreeTextEntry1] :

## 2023-07-11 ENCOUNTER — APPOINTMENT (OUTPATIENT)
Dept: CARDIOLOGY | Facility: CLINIC | Age: 88
End: 2023-07-11
Payer: MEDICARE

## 2023-07-11 VITALS
SYSTOLIC BLOOD PRESSURE: 140 MMHG | DIASTOLIC BLOOD PRESSURE: 70 MMHG | WEIGHT: 158 LBS | BODY MASS INDEX: 31.02 KG/M2 | HEIGHT: 60 IN | HEART RATE: 76 BPM

## 2023-07-11 VITALS — DIASTOLIC BLOOD PRESSURE: 70 MMHG | SYSTOLIC BLOOD PRESSURE: 136 MMHG

## 2023-07-11 DIAGNOSIS — Z86.39 PERSONAL HISTORY OF OTHER ENDOCRINE, NUTRITIONAL AND METABOLIC DISEASE: ICD-10-CM

## 2023-07-11 PROCEDURE — 93000 ELECTROCARDIOGRAM COMPLETE: CPT

## 2023-07-11 PROCEDURE — 99204 OFFICE O/P NEW MOD 45 MIN: CPT

## 2023-07-11 NOTE — ASSESSMENT
[FreeTextEntry1] : The patient has long standing HTN , who has had edema which is most likley from venous staus +/- Amlodipine . The patient has had no SOB  She has LVH on resting ECG . She has increased cholesterol and HTN both of which are adequately controlled

## 2023-07-11 NOTE — HISTORY OF PRESENT ILLNESS
[FreeTextEntry1] : The patient has HTN and increased cholesterol. She has no chest pain or SOB and is very active and plays tennis on a regular basis . . She is here because her PCP wanter her to have an echocardiogram 
23-Jun-2020 18:45

## 2023-08-28 ENCOUNTER — APPOINTMENT (OUTPATIENT)
Dept: CARDIOLOGY | Facility: CLINIC | Age: 88
End: 2023-08-28
Payer: MEDICARE

## 2023-08-28 PROCEDURE — 93306 TTE W/DOPPLER COMPLETE: CPT

## 2023-09-08 ENCOUNTER — APPOINTMENT (OUTPATIENT)
Dept: CARDIOLOGY | Facility: CLINIC | Age: 88
End: 2023-09-08
Payer: MEDICARE

## 2023-09-08 PROCEDURE — 93970 EXTREMITY STUDY: CPT

## 2023-10-23 ENCOUNTER — APPOINTMENT (OUTPATIENT)
Dept: CARDIOLOGY | Facility: CLINIC | Age: 88
End: 2023-10-23
Payer: MEDICARE

## 2023-10-23 VITALS — SYSTOLIC BLOOD PRESSURE: 100 MMHG | DIASTOLIC BLOOD PRESSURE: 60 MMHG

## 2023-10-23 VITALS
WEIGHT: 157 LBS | HEIGHT: 60 IN | DIASTOLIC BLOOD PRESSURE: 64 MMHG | SYSTOLIC BLOOD PRESSURE: 132 MMHG | BODY MASS INDEX: 30.82 KG/M2 | HEART RATE: 73 BPM

## 2023-10-23 PROCEDURE — 93000 ELECTROCARDIOGRAM COMPLETE: CPT

## 2023-10-23 PROCEDURE — 99214 OFFICE O/P EST MOD 30 MIN: CPT

## 2023-11-24 LAB
ALBUMIN SERPL ELPH-MCNC: 4 G/DL
ALP BLD-CCNC: 91 U/L
ALT SERPL-CCNC: 6 U/L
ANION GAP SERPL CALC-SCNC: 11 MMOL/L
AST SERPL-CCNC: 11 U/L
BILIRUB SERPL-MCNC: 0.4 MG/DL
BUN SERPL-MCNC: 19 MG/DL
CALCIUM SERPL-MCNC: 9 MG/DL
CHLORIDE SERPL-SCNC: 104 MMOL/L
CHOLEST SERPL-MCNC: 160 MG/DL
CO2 SERPL-SCNC: 22 MMOL/L
CREAT SERPL-MCNC: 0.8 MG/DL
EGFR: 72 ML/MIN/1.73M2
GLUCOSE SERPL-MCNC: 111 MG/DL
HDLC SERPL-MCNC: 51 MG/DL
LDLC SERPL CALC-MCNC: 97 MG/DL
NONHDLC SERPL-MCNC: 109 MG/DL
NT-PROBNP SERPL-MCNC: 126 PG/ML
POTASSIUM SERPL-SCNC: 4 MMOL/L
PROT SERPL-MCNC: 6.5 G/DL
SODIUM SERPL-SCNC: 137 MMOL/L
TRIGL SERPL-MCNC: 62 MG/DL

## 2023-11-27 ENCOUNTER — APPOINTMENT (OUTPATIENT)
Dept: CARDIOLOGY | Facility: CLINIC | Age: 88
End: 2023-11-27
Payer: MEDICARE

## 2023-11-27 VITALS — DIASTOLIC BLOOD PRESSURE: 70 MMHG | SYSTOLIC BLOOD PRESSURE: 142 MMHG | HEIGHT: 60 IN

## 2023-11-27 VITALS — DIASTOLIC BLOOD PRESSURE: 80 MMHG | SYSTOLIC BLOOD PRESSURE: 128 MMHG

## 2023-11-27 PROCEDURE — 99214 OFFICE O/P EST MOD 30 MIN: CPT

## 2023-11-27 RX ORDER — SIMVASTATIN 20 MG/1
20 TABLET, FILM COATED ORAL
Refills: 0 | Status: DISCONTINUED | COMMUNITY
Start: 2019-03-15 | End: 2023-11-27

## 2023-11-27 RX ORDER — AMLODIPINE BESYLATE AND BENAZEPRIL HYDROCHLORIDE 5; 20 MG/1; MG/1
5-20 CAPSULE ORAL
Qty: 90 | Refills: 3 | Status: ACTIVE | COMMUNITY

## 2023-11-27 RX ORDER — ATORVASTATIN CALCIUM 20 MG/1
20 TABLET, FILM COATED ORAL
Qty: 90 | Refills: 3 | Status: ACTIVE | COMMUNITY
Start: 2023-11-27

## 2023-11-27 RX ORDER — BENAZEPRIL HYDROCHLORIDE 20 MG/1
20 TABLET, FILM COATED ORAL
Qty: 30 | Refills: 6 | Status: DISCONTINUED | COMMUNITY
Start: 2023-10-23 | End: 2023-11-27

## 2023-12-28 ENCOUNTER — APPOINTMENT (OUTPATIENT)
Dept: CARDIOLOGY | Facility: CLINIC | Age: 88
End: 2023-12-28
Payer: MEDICARE

## 2023-12-28 PROCEDURE — 93925 LOWER EXTREMITY STUDY: CPT

## 2024-01-08 ENCOUNTER — APPOINTMENT (OUTPATIENT)
Dept: ORTHOPEDIC SURGERY | Facility: CLINIC | Age: 89
End: 2024-01-08
Payer: MEDICARE

## 2024-01-08 VITALS — BODY MASS INDEX: 30.82 KG/M2 | HEIGHT: 60 IN | WEIGHT: 157 LBS

## 2024-01-08 DIAGNOSIS — M65.331 TRIGGER FINGER, RIGHT MIDDLE FINGER: ICD-10-CM

## 2024-01-08 PROCEDURE — 20550 NJX 1 TENDON SHEATH/LIGAMENT: CPT | Mod: RT

## 2024-01-08 PROCEDURE — 99203 OFFICE O/P NEW LOW 30 MIN: CPT | Mod: 25

## 2024-01-25 NOTE — MONOCLONAL ANTIBODY INFUSION - ASSESSMENT AND PLAN
01/25/2024   [facility]  N/A  For questions about this resource list or additional care needs, please contact your primary care clinic or care manager.  Phone: [phone]   Email: N/A   Address: [address] [place] [address]   Hours: N/A        Hotlines and Helplines       Hotline - Housing crisis  1  [facility] (Main Office) Distance: 2.03 miles      Phone/Virtual   [address] [place] [address]  Language: English  Hours: Mon - Sun Open 24 Hours   Phone: (829) 194-9842 Email: info@[facility] Website: http://[employer] [facility].[employer].com     2  [facility] Distance: 9.06 miles      Phone/Virtual   [address] [place] [address]  Language: English  Hours: Mon - Sun Open 24 Hours   Phone: (653) 213-1395 Email: communications@[facility].org Website: [email or web address]://[facility]-[place].org/oursaviourshousing/          Housing       Coordinated Entry access point  3  [facility] [employer] [place] ([facility] - Housing Services Distance: 8.96 miles      In-Person   [address] [place] [address]  Language: English  Hours: Mon - Fri 9:00 AM - 5:00 PM  Fees: Free   Phone: (599) 979-8078 Email: housing@[facility].org Website: http://www.[facility].org/housing     4  [facility] - Adult [facility] Distance: 10.05 miles      In-Person   [address] [place] [address]  Language: English  Hours: Mon - Sat 10:00 PM - 5:00 PM  Fees: Free   Phone: (556) 600-4160 Email: info@[employer].org Website: http://www.[facility].org/     Drop-in center or day shelter  5  [facility] Distance: 9.37 miles      In-Person   [address] [place] [address]  Language: English  Hours: Mon - Fri 12:00 PM - 3:00 PM  Fees: Free   Phone: (804) 891-5576 Email: [employer] Website: http://[employer].org/     6  [facility] [facility] Distance: 9.49 miles      In-Person    [address] [place] [address]  Language: English, Luxembourger, Lao  Hours: Mon - Sat 7:00 AM - 3:00 PM  Fees: Free, Self Pay   Phone: (237) 674-6034 Email: info@[employer] Website: https://www.[employer]/locations/opportunity-center/     Housing search assistance  7  [facility] [employer] - Rental Homes for Future Homebuyers Program Distance: 2.32 miles      Phone/Virtual   [address] [place] [address]  Language: English  Hours: Mon - Fri 8:00 AM - 4:30 PM  Fees: Free   Phone: (968) 969-7309 Email: hra@[facility] Website: https://www.[facility]/hra/[place]-housing-and-ylovsoysgkxmw-yfbsamvql-hzg     8  [facility] - Online Housing Search Assistance Distance: 7.85 miles      Phone/Virtual   [address] [place] [address]  Language: English  Hours: Mon - Sun Open 24 Hours  Fees: Free   Phone: (274) 814-9242 Email: findhousing@[facility] Website: https://[facility].[employer]/     Shelter for individuals  9  [facility] Distance: 9.06 miles      In-Person   [address] [place] [address]  Language: English  Hours: Mon - Sun Open 24 Hours  Fees: Free   Phone: (988) 882-6365 Email: communications@[facility].org Website: https://[facility].org/oursaviourshousing/     10  [facility] Distance: 10.36 miles      In-Person   [address] [place] [address]  Language: English  Hours: Mon - Fri 4:00 PM - 9:00 AM  Fees: Free   Phone: (130) 515-3907 Email: laurie@[place].org Website: https://[facility].[employer].org/northern/[facility] CenterCenter/          Important Numbers & Websites       Emergency Services   911  City Services   311  Poison Control   (720) 808-8895  Suicide Prevention Lifeline   (195) 200-5271 (TALK)  Child Abuse Hotline   (593) 378-3308 (4-A-Child)  Sexual Assault Hotline   (231) 232-4602 (HOPE)  [place] Runaway Safeline   (740)  294-5584 (RUNAWAY)  All-Options Talkline   (110) 579-7931  Substance Abuse Referral   (981) 763-9474 (HELP)     CC: Monoclonal Antibody Infusion/COVID 19 Positive  86yFemale with PMHx HTN, hypothyroidism, high chol, CHF and symptoms fever, cough, stuffy head, clogged ears, feeling as "head in a vacuum"    exam/findings:  T(C): 37.6 (05-31-22 @ 09:20), Max: 37.6 (05-31-22 @ 09:20)  HR: 74 (05-31-22 @ 09:20) (74 - 74)  BP: 145/76 (05-31-22 @ 09:20) (145/76 - 145/76)  RR: 16 (05-31-22 @ 09:20) (16 - 16)  SpO2: 95% (05-31-22 @ 09:20) (95% - 95%)      PE:   Appearance: NAD	  HEENT:   Normal oral mucosa,   Lymphatic: No lymphadenopathy  Cardiovascular: Normal S1 S2, No JVD, No murmurs, No edema  Respiratory: Lungs clear to auscultation	  Gastrointestinal:  Soft, Non-tender, + BS	  Skin: warm and dry  Neurologic: Non-focal  Extremities: Normal range of motion, no calf tenderness or edema    ASSESSMENT:  Pt is a ---  who is Covid 19 Positive,  was referred by --- who presents to infusion center for Monoclonal antibody infusion (Bebtelovimab)    PLAN:  - infusion procedure explained to patient   - Consent for monoclonal antibody infusion obtained   - Risk & benefits discussed/all questions answered  - infuse Bebtelovimab as per protocol  - observe patient for one hour post infusion        I have reviewed the Bebtelovimab Emergency Use Authorization (EAU) and I have provided the patient or patient's caregiver with the following information:  1. FDA has authorized emergency use of Bebtelovimab, which is not FDA-approved biologic product.  2. The patient or patient's caregiver has the option to accept or refuse administration of Bebtelovimab   3. The significant known and benefits are unknown.  4. Information on available alternative treatments and risks and benefits of those alternatives.    Discharge:  Patient tolerated infusion well denies complaints of chest pain/SOB/dizziness/ palps  Vital signs stable for discharge home ---  D/C instructions given/ fact sheet included.  Patient to follow-up with PCP as needed.       CC: Monoclonal Antibody Infusion/COVID 19 Positive  86yFemale with PMHx HTN, hypothyroidism, high chol, CHF and symptoms fever, cough, stuffy head, clogged ears, feeling as "head in a vacuum". COVID+ 5/28/2022.    exam/findings:  T(C): 37.6 (05-31-22 @ 09:20), Max: 37.6 (05-31-22 @ 09:20)  HR: 74 (05-31-22 @ 09:20) (74 - 74)  BP: 145/76 (05-31-22 @ 09:20) (145/76 - 145/76)  RR: 16 (05-31-22 @ 09:20) (16 - 16)  SpO2: 95% (05-31-22 @ 09:20) (95% - 95%)      PE:   Appearance: NAD	  HEENT:   Normal oral mucosa,   Lymphatic: No lymphadenopathy  Cardiovascular: Normal S1 S2, No JVD, No murmurs, No edema  Respiratory: Lungs clear to auscultation	  Gastrointestinal:  Soft, Non-tender, + BS	  Skin: warm and dry  Neurologic: Non-focal  Extremities: Normal range of motion, no calf tenderness or edema    ASSESSMENT:  Pt is a ---  who is Covid 19 Positive,  was referred by --- who presents to infusion center for Monoclonal antibody infusion (Bebtelovimab)    PLAN:  - infusion procedure explained to patient   - Consent for monoclonal antibody infusion obtained   - Risk & benefits discussed/all questions answered  - infuse Bebtelovimab as per protocol  - observe patient for one hour post infusion        I have reviewed the Bebtelovimab Emergency Use Authorization (EAU) and I have provided the patient or patient's caregiver with the following information:  1. FDA has authorized emergency use of Bebtelovimab, which is not FDA-approved biologic product.  2. The patient or patient's caregiver has the option to accept or refuse administration of Bebtelovimab   3. The significant known and benefits are unknown.  4. Information on available alternative treatments and risks and benefits of those alternatives.    Discharge:  Patient tolerated infusion well denies complaints of chest pain/SOB/dizziness/ palps  Vital signs stable for discharge home ---  D/C instructions given/ fact sheet included.  Patient to follow-up with PCP as needed.

## 2024-04-05 DIAGNOSIS — R60.0 LOCALIZED EDEMA: ICD-10-CM

## 2024-05-16 ENCOUNTER — APPOINTMENT (OUTPATIENT)
Dept: CARDIOLOGY | Facility: CLINIC | Age: 89
End: 2024-05-16
Payer: MEDICARE

## 2024-05-16 VITALS
HEIGHT: 60 IN | SYSTOLIC BLOOD PRESSURE: 132 MMHG | DIASTOLIC BLOOD PRESSURE: 80 MMHG | BODY MASS INDEX: 31.02 KG/M2 | HEART RATE: 63 BPM | WEIGHT: 158 LBS

## 2024-05-16 DIAGNOSIS — R94.31 ABNORMAL ELECTROCARDIOGRAM [ECG] [EKG]: ICD-10-CM

## 2024-05-16 DIAGNOSIS — E78.5 HYPERLIPIDEMIA, UNSPECIFIED: ICD-10-CM

## 2024-05-16 DIAGNOSIS — Z98.890 OTHER SPECIFIED POSTPROCEDURAL STATES: ICD-10-CM

## 2024-05-16 DIAGNOSIS — I10 ESSENTIAL (PRIMARY) HYPERTENSION: ICD-10-CM

## 2024-05-16 PROCEDURE — 93000 ELECTROCARDIOGRAM COMPLETE: CPT

## 2024-05-16 PROCEDURE — 99214 OFFICE O/P EST MOD 30 MIN: CPT

## 2024-05-16 NOTE — ASSESSMENT
[FreeTextEntry1] :  The patient has been feeling well. She does have mild AS and HTN . BP is stable and LDL is stable . She plays tennis at her age . She has normal LV systolic function . She is going for cataract surgery . The patient is an intermediate cardiac risk undergoing a minor risk procedure.    The patient is taking Raloxifene . She dos not have DVT on past venous doppler . This is being given to her by her PCP .

## 2024-05-16 NOTE — CARDIOLOGY SUMMARY
[de-identified] : 7- NSR Voltage for LVH  10- NSR LAFB  5- NSR LAD LVH Early transition  [de-identified] : 8- Noral LV systolic function Trace MR Mild AS mod TR RVSP was 25 mmhg

## 2024-05-17 ENCOUNTER — APPOINTMENT (OUTPATIENT)
Dept: UROLOGY | Facility: CLINIC | Age: 89
End: 2024-05-17
Payer: MEDICARE

## 2024-05-17 VITALS — BODY MASS INDEX: 31.02 KG/M2 | HEIGHT: 60 IN | WEIGHT: 158 LBS

## 2024-05-17 DIAGNOSIS — Z85.42 PERSONAL HISTORY OF MALIGNANT NEOPLASM OF OTHER PARTS OF UTERUS: ICD-10-CM

## 2024-05-17 DIAGNOSIS — R60.0 LOCALIZED EDEMA: ICD-10-CM

## 2024-05-17 DIAGNOSIS — N39.0 URINARY TRACT INFECTION, SITE NOT SPECIFIED: ICD-10-CM

## 2024-05-17 DIAGNOSIS — E66.9 OBESITY, UNSPECIFIED: ICD-10-CM

## 2024-05-17 PROCEDURE — G2211 COMPLEX E/M VISIT ADD ON: CPT

## 2024-05-17 PROCEDURE — 99204 OFFICE O/P NEW MOD 45 MIN: CPT

## 2024-05-17 RX ORDER — METHENAMINE HIPPURATE 1 G/1
1 TABLET ORAL TWICE DAILY
Qty: 60 | Refills: 4 | Status: ACTIVE | COMMUNITY
Start: 2024-05-17 | End: 1900-01-01

## 2024-05-18 LAB — NT-PROBNP SERPL-MCNC: 122 PG/ML

## 2024-05-23 PROBLEM — R60.0 LEG EDEMA: Status: ACTIVE | Noted: 2024-04-05

## 2024-05-23 PROBLEM — E66.9 OBESITY (BMI 30.0-34.9): Status: ACTIVE | Noted: 2023-01-18

## 2024-05-23 PROBLEM — Z85.42 HISTORY OF ENDOMETRIAL CANCER: Status: ACTIVE | Noted: 2023-01-18

## 2024-05-23 LAB
BILIRUB UR QL STRIP: NORMAL
COLLECTION METHOD: NORMAL
GLUCOSE UR-MCNC: NORMAL
HCG UR QL: 0.2 EU/DL
HGB UR QL STRIP.AUTO: NORMAL
KETONES UR-MCNC: NORMAL
LEUKOCYTE ESTERASE UR QL STRIP: NORMAL
NITRITE UR QL STRIP: NORMAL
PH UR STRIP: 5.5
PROT UR STRIP-MCNC: NORMAL
SP GR UR STRIP: 1.01

## 2024-05-23 NOTE — ADDENDUM
[FreeTextEntry1] :  KHUSHBOO CLAYTON, am scribing for and in the presence of  in the following sections: HISTORY OF PRESENT ILLNESS, PAST MEDICAL/FAMILY/SOCIAL HISTORY, REVIEW OF SYSTEMS, VITAL SIGNS, PHYSICAL EXAM, ASSESSMENT/PLAN on 05/17/2024.

## 2024-05-23 NOTE — ASSESSMENT
[FreeTextEntry1] :  88 year old female patient with recurrent UTIs, likely atrophic vaginitis. Patient will start on Methenamine and Vitamin C. Risks and benefits were discussed. She understands and agrees. I have asked patient to obtain a renal and bladder ultrasound and follow-up for a cystoscopy.  All of the patient's questions were otherwise answered. Patient was seen with LOREE Hernandez.    The submitted E/M billing level for this visit reflects the total time spent on the day of the visit including face-to-face time spent with the patient, non-face-to-face review of medical records and relevant information, documentation, and asynchronous communication with the patient after a visit via phone, email, or patients EHR portal after the visit. The medical records reviewed are either scanned into the chart or reviewed with the patient using a patients electronic medical records portal for patients with records not available to [facility] via electronic transmission platforms from other institutions and labs. Time spent counseling and performing coordination of care was also included in determining the appropriate EM billing level.   I have reviewed and verified information regarding the chief complaint and history recorded by the ancillary staff and/or the patient. I have independently reviewed and interpreted tests performed by other physicians and facilities as necessary.   I have discussed with the patient differential diagnosis, reason for auxiliary tests if ordered, risks, benefits, alternatives, and complications of each form of therapy were discussed.  I personally performed the services described in the documentation, reviewed the documentation recorded by the scribe in my presence, and it accurately and completely records my words and actions.

## 2024-05-23 NOTE — HISTORY OF PRESENT ILLNESS
[FreeTextEntry1] :  88 year old female patient seen for evaluation of recurrent UTIs. She states she had 2 infections since January and had 3-4 positive cultures last year. Her biggest symptom is worsening frequency and "strange feeling." Denies dysuria, fevers, or hospitalizations with infections. Pt improves on antibiotics within 1-2 days, however the last time she was treated with Levofloxacin and she had a reaction to the medication. She complains of frequency mostly in the morning after her diuretic and occasional nocturia x1. Denies constipation and states that her bowels are regular. Denies gross hematuria. She had noted microhematuria on urinalyses.   I reviewed the following labs: LABS from 11/24/23:  BUN: 19 CRT: 0.8 AST: 11 ALT: 6 ALK PHOS: 91

## 2024-06-20 ENCOUNTER — NON-APPOINTMENT (OUTPATIENT)
Age: 89
End: 2024-06-20

## 2024-06-21 ENCOUNTER — APPOINTMENT (OUTPATIENT)
Dept: UROLOGY | Facility: CLINIC | Age: 89
End: 2024-06-21
Payer: MEDICARE

## 2024-06-21 ENCOUNTER — NON-APPOINTMENT (OUTPATIENT)
Age: 89
End: 2024-06-21

## 2024-06-21 DIAGNOSIS — N39.0 URINARY TRACT INFECTION, SITE NOT SPECIFIED: ICD-10-CM

## 2024-06-21 DIAGNOSIS — R30.0 DYSURIA: ICD-10-CM

## 2024-06-21 DIAGNOSIS — N95.2 POSTMENOPAUSAL ATROPHIC VAGINITIS: ICD-10-CM

## 2024-06-21 PROCEDURE — 52000 CYSTOURETHROSCOPY: CPT

## 2024-06-21 PROCEDURE — 81003 URINALYSIS AUTO W/O SCOPE: CPT | Mod: QW

## 2024-06-21 RX ORDER — ESTRADIOL 0.1 MG/G
0.1 CREAM VAGINAL
Qty: 1 | Refills: 3 | Status: ACTIVE | OUTPATIENT
Start: 2024-06-21

## 2024-06-27 PROBLEM — R30.0 DYSURIA: Status: ACTIVE | Noted: 2022-11-07

## 2024-06-27 PROBLEM — N39.0 UTI (URINARY TRACT INFECTION): Status: ACTIVE | Noted: 2022-11-11

## 2024-06-27 LAB
BILIRUB UR QL STRIP: NORMAL
COLLECTION METHOD: NORMAL
GLUCOSE UR-MCNC: NORMAL
HCG UR QL: 0.2 EU/DL
HGB UR QL STRIP.AUTO: NORMAL
KETONES UR-MCNC: NORMAL
LEUKOCYTE ESTERASE UR QL STRIP: NORMAL
NITRITE UR QL STRIP: NORMAL
PH UR STRIP: 6
PROT UR STRIP-MCNC: NORMAL
SP GR UR STRIP: 1.01

## 2024-09-08 ENCOUNTER — NON-APPOINTMENT (OUTPATIENT)
Age: 89
End: 2024-09-08

## 2024-09-09 ENCOUNTER — APPOINTMENT (OUTPATIENT)
Dept: UROLOGY | Facility: CLINIC | Age: 89
End: 2024-09-09
Payer: MEDICARE

## 2024-09-09 VITALS
HEIGHT: 60 IN | OXYGEN SATURATION: 97 % | RESPIRATION RATE: 17 BRPM | HEART RATE: 71 BPM | WEIGHT: 158 LBS | DIASTOLIC BLOOD PRESSURE: 72 MMHG | SYSTOLIC BLOOD PRESSURE: 147 MMHG | TEMPERATURE: 98 F | BODY MASS INDEX: 31.02 KG/M2

## 2024-09-09 DIAGNOSIS — N39.0 URINARY TRACT INFECTION, SITE NOT SPECIFIED: ICD-10-CM

## 2024-09-09 PROCEDURE — 99213 OFFICE O/P EST LOW 20 MIN: CPT

## 2024-09-09 PROCEDURE — G2211 COMPLEX E/M VISIT ADD ON: CPT

## 2024-09-09 NOTE — ADDENDUM
[FreeTextEntry1] :  KHUSHBOO CLAYTON, am scribing for and in the presence of  in the following sections: HISTORY OF PRESENT ILLNESS, PAST MEDICAL/FAMILY/SOCIAL HISTORY, REVIEW OF SYSTEMS, VITAL SIGNS, PHYSICAL EXAM, ASSESSMENT/PLAN on 09/09/2024.

## 2024-09-09 NOTE — ASSESSMENT
[FreeTextEntry1] :  88 year old female patient with history of recurrent UTIs. She has done well on Methenamine and Vitamin C. She will stop Methenamine and vitamin C when she runs out of her current prescription. She will try cranberry pills. We will plan to see her back in 6 months. If any breakthrough infections, she will contact us and we will start her back on Methenamine and Vitamin C for 3-4 months.  All of the patient's questions were otherwise answered.   The submitted E/M billing level for this visit reflects the total time spent on the day of the visit including face-to-face time spent with the patient, non-face-to-face review of medical records and relevant information, documentation, and asynchronous communication with the patient after a visit via phone, email, or patients EHR portal after the visit. The medical records reviewed are either scanned into the chart or reviewed with the patient using a patients electronic medical records portal for patients with records not available to [facility] via electronic transmission platforms from other institutions and labs. Time spent counseling and performing coordination of care was also included in determining the appropriate EM billing level.   I have reviewed and verified information regarding the chief complaint and history recorded by the ancillary staff and/or the patient. I have independently reviewed and interpreted tests performed by other physicians and facilities as necessary.   I have discussed with the patient differential diagnosis, reason for auxiliary tests if ordered, risks, benefits, alternatives, and complications of each form of therapy were discussed.  I personally performed the services described in the documentation, reviewed the documentation recorded by the scribe in my presence, and it accurately and completely records my words and actions.

## 2024-09-09 NOTE — HISTORY OF PRESENT ILLNESS
[FreeTextEntry1] :  88 year old female patient with history of recurrent UTIs, placed on Methenamine and Vitamin C. She has had no infections in last 3 months. She comes in to discuss options. She wants to try and come off the Methenamine. She will go on cranberry extract. We discussed continuing Methenamine and Vitamin for 3 months, however pt would like to discontinue at this point. Otherwise, her bowels are regular and she is trying to stay hydrated. Otherwise, there are no changes to her medical or surgical history.

## 2024-12-19 NOTE — H&P PST ADULT - NEUROLOGICAL
previous_biopsy_has_been_previously_biopsied
Alert & oriented; no sensory, motor or coordination deficits, normal reflexes

## 2025-01-31 ENCOUNTER — NON-APPOINTMENT (OUTPATIENT)
Age: 89
End: 2025-01-31

## 2025-03-10 ENCOUNTER — APPOINTMENT (OUTPATIENT)
Dept: UROLOGY | Facility: CLINIC | Age: 89
End: 2025-03-10
Payer: MEDICARE

## 2025-03-10 VITALS
HEIGHT: 60 IN | TEMPERATURE: 98.3 F | BODY MASS INDEX: 30.63 KG/M2 | SYSTOLIC BLOOD PRESSURE: 134 MMHG | RESPIRATION RATE: 16 BRPM | HEART RATE: 75 BPM | DIASTOLIC BLOOD PRESSURE: 80 MMHG | WEIGHT: 156 LBS | OXYGEN SATURATION: 95 %

## 2025-03-10 DIAGNOSIS — I10 ESSENTIAL (PRIMARY) HYPERTENSION: ICD-10-CM

## 2025-03-10 DIAGNOSIS — E66.811 OBESITY, CLASS 1: ICD-10-CM

## 2025-03-10 DIAGNOSIS — N39.0 URINARY TRACT INFECTION, SITE NOT SPECIFIED: ICD-10-CM

## 2025-03-10 PROCEDURE — 99213 OFFICE O/P EST LOW 20 MIN: CPT

## 2025-03-13 PROBLEM — E66.811 OBESITY (BMI 30.0-34.9): Status: ACTIVE | Noted: 2023-01-18

## 2025-05-01 ENCOUNTER — NON-APPOINTMENT (OUTPATIENT)
Age: 89
End: 2025-05-01

## 2025-05-02 ENCOUNTER — APPOINTMENT (OUTPATIENT)
Dept: CARDIOLOGY | Facility: CLINIC | Age: 89
End: 2025-05-02
Payer: MEDICARE

## 2025-05-02 DIAGNOSIS — R94.31 ABNORMAL ELECTROCARDIOGRAM [ECG] [EKG]: ICD-10-CM

## 2025-05-02 DIAGNOSIS — I35.0 NONRHEUMATIC AORTIC (VALVE) STENOSIS: ICD-10-CM

## 2025-05-02 DIAGNOSIS — I10 ESSENTIAL (PRIMARY) HYPERTENSION: ICD-10-CM

## 2025-05-02 DIAGNOSIS — R60.0 LOCALIZED EDEMA: ICD-10-CM

## 2025-05-02 PROCEDURE — 93970 EXTREMITY STUDY: CPT

## 2025-05-02 PROCEDURE — 93306 TTE W/DOPPLER COMPLETE: CPT

## 2025-05-09 PROBLEM — I35.0 NONRHEUMATIC AORTIC VALVE STENOSIS: Status: ACTIVE | Noted: 2025-05-09

## 2025-05-15 ENCOUNTER — APPOINTMENT (OUTPATIENT)
Dept: CARDIOLOGY | Facility: CLINIC | Age: 89
End: 2025-05-15
Payer: MEDICARE

## 2025-05-15 VITALS
HEIGHT: 60 IN | HEART RATE: 68 BPM | WEIGHT: 154 LBS | DIASTOLIC BLOOD PRESSURE: 70 MMHG | SYSTOLIC BLOOD PRESSURE: 124 MMHG | BODY MASS INDEX: 30.23 KG/M2

## 2025-05-15 DIAGNOSIS — Z98.890 OTHER SPECIFIED POSTPROCEDURAL STATES: ICD-10-CM

## 2025-05-15 DIAGNOSIS — R94.31 ABNORMAL ELECTROCARDIOGRAM [ECG] [EKG]: ICD-10-CM

## 2025-05-15 DIAGNOSIS — E78.5 HYPERLIPIDEMIA, UNSPECIFIED: ICD-10-CM

## 2025-05-15 DIAGNOSIS — R60.0 LOCALIZED EDEMA: ICD-10-CM

## 2025-05-15 DIAGNOSIS — I35.0 NONRHEUMATIC AORTIC (VALVE) STENOSIS: ICD-10-CM

## 2025-05-15 PROCEDURE — 99214 OFFICE O/P EST MOD 30 MIN: CPT

## 2025-05-15 PROCEDURE — 93000 ELECTROCARDIOGRAM COMPLETE: CPT

## 2025-05-23 ENCOUNTER — OUTPATIENT (OUTPATIENT)
Dept: OUTPATIENT SERVICES | Facility: HOSPITAL | Age: 89
LOS: 1 days | End: 2025-05-23
Payer: MEDICARE

## 2025-05-23 ENCOUNTER — RESULT REVIEW (OUTPATIENT)
Age: 89
End: 2025-05-23

## 2025-05-23 DIAGNOSIS — Z90.710 ACQUIRED ABSENCE OF BOTH CERVIX AND UTERUS: Chronic | ICD-10-CM

## 2025-05-23 DIAGNOSIS — Z98.890 OTHER SPECIFIED POSTPROCEDURAL STATES: Chronic | ICD-10-CM

## 2025-05-23 DIAGNOSIS — R94.31 ABNORMAL ELECTROCARDIOGRAM [ECG] [EKG]: ICD-10-CM

## 2025-05-23 DIAGNOSIS — R60.0 LOCALIZED EDEMA: ICD-10-CM

## 2025-05-23 PROCEDURE — A9500: CPT

## 2025-05-23 PROCEDURE — 93018 CV STRESS TEST I&R ONLY: CPT

## 2025-05-23 PROCEDURE — 78452 HT MUSCLE IMAGE SPECT MULT: CPT

## 2025-05-23 PROCEDURE — 78452 HT MUSCLE IMAGE SPECT MULT: CPT | Mod: 26

## 2025-05-24 DIAGNOSIS — R94.31 ABNORMAL ELECTROCARDIOGRAM [ECG] [EKG]: ICD-10-CM

## 2025-05-24 DIAGNOSIS — R60.0 LOCALIZED EDEMA: ICD-10-CM

## 2025-07-08 ENCOUNTER — APPOINTMENT (OUTPATIENT)
Dept: ORTHOPEDIC SURGERY | Facility: CLINIC | Age: 89
End: 2025-07-08
Payer: MEDICARE

## 2025-07-08 ENCOUNTER — RESULT CHARGE (OUTPATIENT)
Age: 89
End: 2025-07-08

## 2025-07-08 PROCEDURE — 99203 OFFICE O/P NEW LOW 30 MIN: CPT

## 2025-07-08 PROCEDURE — 73560 X-RAY EXAM OF KNEE 1 OR 2: CPT | Mod: 50

## 2025-07-31 ENCOUNTER — APPOINTMENT (OUTPATIENT)
Facility: CLINIC | Age: 89
End: 2025-07-31

## 2025-07-31 ENCOUNTER — APPOINTMENT (OUTPATIENT)
Facility: CLINIC | Age: 89
End: 2025-07-31
Payer: MEDICARE

## 2025-07-31 DIAGNOSIS — M17.12 UNILATERAL PRIMARY OSTEOARTHRITIS, LEFT KNEE: ICD-10-CM

## 2025-07-31 PROCEDURE — 99205 OFFICE O/P NEW HI 60 MIN: CPT

## 2025-08-05 ENCOUNTER — NON-APPOINTMENT (OUTPATIENT)
Age: 89
End: 2025-08-05

## 2025-08-11 ENCOUNTER — APPOINTMENT (OUTPATIENT)
Dept: CARDIOLOGY | Facility: CLINIC | Age: 89
End: 2025-08-11

## 2025-08-19 ENCOUNTER — OUTPATIENT (OUTPATIENT)
Dept: OUTPATIENT SERVICES | Facility: HOSPITAL | Age: 89
LOS: 1 days | End: 2025-08-19
Payer: MEDICARE

## 2025-08-19 ENCOUNTER — RESULT REVIEW (OUTPATIENT)
Age: 89
End: 2025-08-19

## 2025-08-19 VITALS
DIASTOLIC BLOOD PRESSURE: 79 MMHG | RESPIRATION RATE: 18 BRPM | HEART RATE: 72 BPM | WEIGHT: 156.97 LBS | HEIGHT: 62 IN | SYSTOLIC BLOOD PRESSURE: 139 MMHG | OXYGEN SATURATION: 97 % | TEMPERATURE: 98 F

## 2025-08-19 DIAGNOSIS — Z98.890 OTHER SPECIFIED POSTPROCEDURAL STATES: Chronic | ICD-10-CM

## 2025-08-19 DIAGNOSIS — Z90.710 ACQUIRED ABSENCE OF BOTH CERVIX AND UTERUS: Chronic | ICD-10-CM

## 2025-08-19 DIAGNOSIS — M17.12 UNILATERAL PRIMARY OSTEOARTHRITIS, LEFT KNEE: ICD-10-CM

## 2025-08-19 DIAGNOSIS — Z01.818 ENCOUNTER FOR OTHER PREPROCEDURAL EXAMINATION: ICD-10-CM

## 2025-08-19 LAB
A1C WITH ESTIMATED AVERAGE GLUCOSE RESULT: 6.2 % — HIGH (ref 4–5.6)
ALBUMIN SERPL ELPH-MCNC: 4.1 G/DL — SIGNIFICANT CHANGE UP (ref 3.5–5.2)
ALP SERPL-CCNC: 104 U/L — SIGNIFICANT CHANGE UP (ref 30–115)
ALT FLD-CCNC: 9 U/L — SIGNIFICANT CHANGE UP (ref 0–41)
ANION GAP SERPL CALC-SCNC: 14 MMOL/L — SIGNIFICANT CHANGE UP (ref 7–14)
APTT BLD: 27.6 SEC — SIGNIFICANT CHANGE UP (ref 27–39.2)
AST SERPL-CCNC: 12 U/L — SIGNIFICANT CHANGE UP (ref 0–41)
BILIRUB SERPL-MCNC: 0.3 MG/DL — SIGNIFICANT CHANGE UP (ref 0.2–1.2)
BLD GP AB SCN SERPL QL: SIGNIFICANT CHANGE UP
BUN SERPL-MCNC: 17 MG/DL — SIGNIFICANT CHANGE UP (ref 10–20)
CALCIUM SERPL-MCNC: 9.1 MG/DL — SIGNIFICANT CHANGE UP (ref 8.4–10.5)
CHLORIDE SERPL-SCNC: 105 MMOL/L — SIGNIFICANT CHANGE UP (ref 98–110)
CO2 SERPL-SCNC: 22 MMOL/L — SIGNIFICANT CHANGE UP (ref 17–32)
CREAT SERPL-MCNC: 0.7 MG/DL — SIGNIFICANT CHANGE UP (ref 0.7–1.5)
EGFR: 84 ML/MIN/1.73M2 — SIGNIFICANT CHANGE UP
EGFR: 84 ML/MIN/1.73M2 — SIGNIFICANT CHANGE UP
ESTIMATED AVERAGE GLUCOSE: 131 MG/DL — HIGH (ref 68–114)
GLUCOSE SERPL-MCNC: 104 MG/DL — HIGH (ref 70–99)
INR BLD: 0.94 RATIO — SIGNIFICANT CHANGE UP (ref 0.65–1.3)
MRSA PCR RESULT.: SIGNIFICANT CHANGE UP
POTASSIUM SERPL-MCNC: 3.9 MMOL/L — SIGNIFICANT CHANGE UP (ref 3.5–5)
POTASSIUM SERPL-SCNC: 3.9 MMOL/L — SIGNIFICANT CHANGE UP (ref 3.5–5)
PROT SERPL-MCNC: 7 G/DL — SIGNIFICANT CHANGE UP (ref 6–8)
PROTHROM AB SERPL-ACNC: 11.1 SEC — SIGNIFICANT CHANGE UP (ref 9.95–12.87)
SODIUM SERPL-SCNC: 141 MMOL/L — SIGNIFICANT CHANGE UP (ref 135–146)

## 2025-08-19 PROCEDURE — 80053 COMPREHEN METABOLIC PANEL: CPT

## 2025-08-19 PROCEDURE — 99214 OFFICE O/P EST MOD 30 MIN: CPT | Mod: 25

## 2025-08-19 PROCEDURE — 93005 ELECTROCARDIOGRAM TRACING: CPT

## 2025-08-19 PROCEDURE — 72170 X-RAY EXAM OF PELVIS: CPT

## 2025-08-19 PROCEDURE — 83036 HEMOGLOBIN GLYCOSYLATED A1C: CPT

## 2025-08-19 PROCEDURE — 85730 THROMBOPLASTIN TIME PARTIAL: CPT

## 2025-08-19 PROCEDURE — 72170 X-RAY EXAM OF PELVIS: CPT | Mod: 26

## 2025-08-19 PROCEDURE — 86850 RBC ANTIBODY SCREEN: CPT

## 2025-08-19 PROCEDURE — 87640 STAPH A DNA AMP PROBE: CPT

## 2025-08-19 PROCEDURE — 73562 X-RAY EXAM OF KNEE 3: CPT | Mod: LT

## 2025-08-19 PROCEDURE — 93010 ELECTROCARDIOGRAM REPORT: CPT

## 2025-08-19 PROCEDURE — 85025 COMPLETE CBC W/AUTO DIFF WBC: CPT

## 2025-08-19 PROCEDURE — 86901 BLOOD TYPING SEROLOGIC RH(D): CPT

## 2025-08-19 PROCEDURE — 73562 X-RAY EXAM OF KNEE 3: CPT | Mod: 26,LT

## 2025-08-19 PROCEDURE — 87641 MR-STAPH DNA AMP PROBE: CPT

## 2025-08-19 PROCEDURE — 85610 PROTHROMBIN TIME: CPT

## 2025-08-19 PROCEDURE — 86900 BLOOD TYPING SEROLOGIC ABO: CPT

## 2025-08-19 PROCEDURE — 36415 COLL VENOUS BLD VENIPUNCTURE: CPT

## 2025-08-20 DIAGNOSIS — Z01.818 ENCOUNTER FOR OTHER PREPROCEDURAL EXAMINATION: ICD-10-CM

## 2025-08-20 DIAGNOSIS — M17.12 UNILATERAL PRIMARY OSTEOARTHRITIS, LEFT KNEE: ICD-10-CM

## 2025-08-21 ENCOUNTER — NON-APPOINTMENT (OUTPATIENT)
Age: 89
End: 2025-08-21

## 2025-08-21 ENCOUNTER — APPOINTMENT (OUTPATIENT)
Dept: CARDIOLOGY | Facility: CLINIC | Age: 89
End: 2025-08-21
Payer: MEDICARE

## 2025-08-21 VITALS
WEIGHT: 157 LBS | HEART RATE: 70 BPM | HEIGHT: 60 IN | DIASTOLIC BLOOD PRESSURE: 70 MMHG | BODY MASS INDEX: 30.82 KG/M2 | SYSTOLIC BLOOD PRESSURE: 124 MMHG

## 2025-08-21 DIAGNOSIS — E78.5 HYPERLIPIDEMIA, UNSPECIFIED: ICD-10-CM

## 2025-08-21 DIAGNOSIS — R94.31 ABNORMAL ELECTROCARDIOGRAM [ECG] [EKG]: ICD-10-CM

## 2025-08-21 DIAGNOSIS — R60.0 LOCALIZED EDEMA: ICD-10-CM

## 2025-08-21 DIAGNOSIS — I10 ESSENTIAL (PRIMARY) HYPERTENSION: ICD-10-CM

## 2025-08-21 PROCEDURE — 99214 OFFICE O/P EST MOD 30 MIN: CPT

## 2025-08-21 PROCEDURE — 93000 ELECTROCARDIOGRAM COMPLETE: CPT

## 2025-08-22 LAB
BASOPHILS # BLD AUTO: 0.05 K/UL — SIGNIFICANT CHANGE UP (ref 0–0.2)
BASOPHILS NFR BLD AUTO: 0.8 % — SIGNIFICANT CHANGE UP (ref 0–2)
EOSINOPHIL # BLD AUTO: 0.07 K/UL — SIGNIFICANT CHANGE UP (ref 0–0.5)
EOSINOPHIL NFR BLD AUTO: 1.1 % — SIGNIFICANT CHANGE UP (ref 0–6)
HCT VFR BLD CALC: 38.5 % — SIGNIFICANT CHANGE UP (ref 34.5–45)
HGB BLD-MCNC: 12.5 G/DL — SIGNIFICANT CHANGE UP (ref 11.5–15.5)
IMM GRANULOCYTES NFR BLD AUTO: 0.5 % — SIGNIFICANT CHANGE UP (ref 0–0.9)
LYMPHOCYTES # BLD AUTO: 1.89 K/UL — SIGNIFICANT CHANGE UP (ref 1–3.3)
LYMPHOCYTES # BLD AUTO: 30.2 % — SIGNIFICANT CHANGE UP (ref 13–44)
MCHC RBC-ENTMCNC: 30.9 PG — SIGNIFICANT CHANGE UP (ref 27–34)
MCHC RBC-ENTMCNC: 32.5 G/DL — SIGNIFICANT CHANGE UP (ref 32–36)
MCV RBC AUTO: 95.1 FL — SIGNIFICANT CHANGE UP (ref 80–100)
MONOCYTES # BLD AUTO: 0.57 K/UL — SIGNIFICANT CHANGE UP (ref 0–0.9)
MONOCYTES NFR BLD AUTO: 9.1 % — SIGNIFICANT CHANGE UP (ref 2–14)
NEUTROPHILS # BLD AUTO: 3.65 K/UL — SIGNIFICANT CHANGE UP (ref 1.8–7.4)
NEUTROPHILS NFR BLD AUTO: 58.3 % — SIGNIFICANT CHANGE UP (ref 43–77)
PLATELET # BLD AUTO: 213 K/UL — SIGNIFICANT CHANGE UP (ref 150–400)
RBC # BLD: 4.05 M/UL — SIGNIFICANT CHANGE UP (ref 3.8–5.2)
RBC # FLD: 13.2 % — SIGNIFICANT CHANGE UP (ref 10.3–14.5)
WBC # BLD: 6.26 K/UL — SIGNIFICANT CHANGE UP (ref 3.8–10.5)
WBC # FLD AUTO: 6.26 K/UL — SIGNIFICANT CHANGE UP (ref 3.8–10.5)

## 2025-08-26 ENCOUNTER — NON-APPOINTMENT (OUTPATIENT)
Age: 89
End: 2025-08-26

## 2025-09-08 ENCOUNTER — RESULT REVIEW (OUTPATIENT)
Age: 89
End: 2025-09-08

## 2025-09-08 ENCOUNTER — APPOINTMENT (OUTPATIENT)
Dept: ORTHOPEDIC SURGERY | Facility: HOSPITAL | Age: 89
End: 2025-09-08

## 2025-09-09 ENCOUNTER — TRANSCRIPTION ENCOUNTER (OUTPATIENT)
Age: 89
End: 2025-09-09

## 2025-09-09 RX ORDER — ATORVASTATIN CALCIUM 80 MG/1
1 TABLET, FILM COATED ORAL
Qty: 0 | Refills: 0 | DISCHARGE

## 2025-09-18 ENCOUNTER — NON-APPOINTMENT (OUTPATIENT)
Age: 89
End: 2025-09-18